# Patient Record
Sex: FEMALE | Race: WHITE
[De-identification: names, ages, dates, MRNs, and addresses within clinical notes are randomized per-mention and may not be internally consistent; named-entity substitution may affect disease eponyms.]

---

## 2022-05-15 ENCOUNTER — HOSPITAL ENCOUNTER (EMERGENCY)
Dept: HOSPITAL 54 - ER | Age: 77
Discharge: HOME | End: 2022-05-15
Payer: MEDICARE

## 2022-05-15 VITALS — HEIGHT: 66 IN | BODY MASS INDEX: 22.66 KG/M2 | WEIGHT: 141 LBS

## 2022-05-15 VITALS — SYSTOLIC BLOOD PRESSURE: 142 MMHG | DIASTOLIC BLOOD PRESSURE: 74 MMHG

## 2022-05-15 DIAGNOSIS — Z79.891: ICD-10-CM

## 2022-05-15 DIAGNOSIS — Z88.0: ICD-10-CM
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
## 2022-05-15 DIAGNOSIS — J44.9: ICD-10-CM

## 2022-05-15 DIAGNOSIS — S52.591A: Primary | ICD-10-CM

## 2022-05-15 DIAGNOSIS — J45.909: ICD-10-CM
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
## 2022-05-15 PROCEDURE — 73080 X-RAY EXAM OF ELBOW: CPT

## 2022-05-15 PROCEDURE — 96372 THER/PROPH/DIAG INJ SC/IM: CPT

## 2022-05-15 PROCEDURE — 73110 X-RAY EXAM OF WRIST: CPT

## 2022-05-15 PROCEDURE — 99284 EMERGENCY DEPT VISIT MOD MDM: CPT

## 2022-05-15 NOTE — NUR
BIB[facility] GLF C/O RT WRIST PAIN, HEAD INJURY -KO. RATES PAIN 4/10. IN ROOM AIR 
AND DENIES SOB. RESPIRATION REGULAR AND UNLABORED. WILL CONTINUE T MONITOR THE 
PATIENT.

## 2022-06-05 ENCOUNTER — HOSPITAL ENCOUNTER (EMERGENCY)
Dept: HOSPITAL 54 - ER | Age: 77
Discharge: HOME | End: 2022-06-05
Payer: MEDICARE

## 2022-06-05 VITALS — HEIGHT: 66 IN | BODY MASS INDEX: 22.66 KG/M2 | WEIGHT: 141 LBS

## 2022-06-05 VITALS — DIASTOLIC BLOOD PRESSURE: 68 MMHG | SYSTOLIC BLOOD PRESSURE: 135 MMHG

## 2022-06-05 DIAGNOSIS — M79.89: ICD-10-CM

## 2022-06-05 DIAGNOSIS — J45.909: ICD-10-CM

## 2022-06-05 DIAGNOSIS — R20.2: Primary | ICD-10-CM

## 2022-06-05 DIAGNOSIS — Z88.0: ICD-10-CM

## 2022-06-05 DIAGNOSIS — J44.9: ICD-10-CM

## 2022-06-05 DIAGNOSIS — Z79.899: ICD-10-CM
